# Patient Record
Sex: FEMALE | Race: WHITE | NOT HISPANIC OR LATINO | Employment: FULL TIME | URBAN - METROPOLITAN AREA
[De-identification: names, ages, dates, MRNs, and addresses within clinical notes are randomized per-mention and may not be internally consistent; named-entity substitution may affect disease eponyms.]

---

## 2018-09-06 ENCOUNTER — OFFICE VISIT (OUTPATIENT)
Dept: ALLERGY | Facility: CLINIC | Age: 48
End: 2018-09-06
Payer: COMMERCIAL

## 2018-09-06 ENCOUNTER — LAB VISIT (OUTPATIENT)
Dept: LAB | Facility: HOSPITAL | Age: 48
End: 2018-09-06
Attending: ALLERGY & IMMUNOLOGY
Payer: COMMERCIAL

## 2018-09-06 VITALS
BODY MASS INDEX: 43.34 KG/M2 | DIASTOLIC BLOOD PRESSURE: 92 MMHG | WEIGHT: 285.94 LBS | HEIGHT: 68 IN | SYSTOLIC BLOOD PRESSURE: 140 MMHG

## 2018-09-06 DIAGNOSIS — N20.0 NEPHROLITHIASIS: ICD-10-CM

## 2018-09-06 DIAGNOSIS — L50.1 IDIOPATHIC URTICARIA: ICD-10-CM

## 2018-09-06 DIAGNOSIS — T78.3XXA ANGIOEDEMA, INITIAL ENCOUNTER: ICD-10-CM

## 2018-09-06 DIAGNOSIS — R06.2 WHEEZING: ICD-10-CM

## 2018-09-06 DIAGNOSIS — K21.9 GASTROESOPHAGEAL REFLUX DISEASE WITHOUT ESOPHAGITIS: ICD-10-CM

## 2018-09-06 DIAGNOSIS — R05.9 COUGH: ICD-10-CM

## 2018-09-06 DIAGNOSIS — J31.0 RHINITIS, UNSPECIFIED TYPE: Primary | ICD-10-CM

## 2018-09-06 DIAGNOSIS — G43.909 MIGRAINE WITHOUT STATUS MIGRAINOSUS, NOT INTRACTABLE, UNSPECIFIED MIGRAINE TYPE: ICD-10-CM

## 2018-09-06 DIAGNOSIS — I10 ESSENTIAL HYPERTENSION: ICD-10-CM

## 2018-09-06 DIAGNOSIS — G47.30 SLEEP APNEA, UNSPECIFIED TYPE: ICD-10-CM

## 2018-09-06 DIAGNOSIS — M15.9 PRIMARY OSTEOARTHRITIS INVOLVING MULTIPLE JOINTS: ICD-10-CM

## 2018-09-06 LAB
25(OH)D3+25(OH)D2 SERPL-MCNC: 27 NG/ML
ALBUMIN SERPL BCP-MCNC: 4.1 G/DL
ALP SERPL-CCNC: 65 U/L
ALT SERPL W/O P-5'-P-CCNC: 27 U/L
ANION GAP SERPL CALC-SCNC: 7 MMOL/L
AST SERPL-CCNC: 23 U/L
BASOPHILS # BLD AUTO: 0.05 K/UL
BASOPHILS NFR BLD: 0.7 %
BILIRUB SERPL-MCNC: 0.3 MG/DL
BUN SERPL-MCNC: 14 MG/DL
CALCIUM SERPL-MCNC: 9.8 MG/DL
CHLORIDE SERPL-SCNC: 106 MMOL/L
CO2 SERPL-SCNC: 24 MMOL/L
CREAT SERPL-MCNC: 0.7 MG/DL
DIFFERENTIAL METHOD: NORMAL
EOSINOPHIL # BLD AUTO: 0.3 K/UL
EOSINOPHIL NFR BLD: 3.4 %
ERYTHROCYTE [DISTWIDTH] IN BLOOD BY AUTOMATED COUNT: 14.1 %
EST. GFR  (AFRICAN AMERICAN): >60 ML/MIN/1.73 M^2
EST. GFR  (NON AFRICAN AMERICAN): >60 ML/MIN/1.73 M^2
GLUCOSE SERPL-MCNC: 102 MG/DL
HCT VFR BLD AUTO: 38.5 %
HGB BLD-MCNC: 12.4 G/DL
LYMPHOCYTES # BLD AUTO: 1.9 K/UL
LYMPHOCYTES NFR BLD: 25.7 %
MCH RBC QN AUTO: 27.8 PG
MCHC RBC AUTO-ENTMCNC: 32.2 G/DL
MCV RBC AUTO: 86 FL
MONOCYTES # BLD AUTO: 0.6 K/UL
MONOCYTES NFR BLD: 7.6 %
NEUTROPHILS # BLD AUTO: 4.6 K/UL
NEUTROPHILS NFR BLD: 62.3 %
PLATELET # BLD AUTO: 332 K/UL
PMV BLD AUTO: 9.9 FL
POTASSIUM SERPL-SCNC: 4 MMOL/L
PROT SERPL-MCNC: 6.9 G/DL
RBC # BLD AUTO: 4.46 M/UL
SODIUM SERPL-SCNC: 137 MMOL/L
TSH SERPL DL<=0.005 MIU/L-ACNC: 1.17 UIU/ML
WBC # BLD AUTO: 7.4 K/UL

## 2018-09-06 PROCEDURE — 82306 VITAMIN D 25 HYDROXY: CPT

## 2018-09-06 PROCEDURE — 84165 PROTEIN E-PHORESIS SERUM: CPT

## 2018-09-06 PROCEDURE — 86003 ALLG SPEC IGE CRUDE XTRC EA: CPT

## 2018-09-06 PROCEDURE — 84165 PROTEIN E-PHORESIS SERUM: CPT | Mod: 26,,, | Performed by: PATHOLOGY

## 2018-09-06 PROCEDURE — 83520 IMMUNOASSAY QUANT NOS NONAB: CPT

## 2018-09-06 PROCEDURE — 86003 ALLG SPEC IGE CRUDE XTRC EA: CPT | Mod: 59

## 2018-09-06 PROCEDURE — 84443 ASSAY THYROID STIM HORMONE: CPT

## 2018-09-06 PROCEDURE — 86800 THYROGLOBULIN ANTIBODY: CPT

## 2018-09-06 PROCEDURE — 99245 OFF/OP CONSLTJ NEW/EST HI 55: CPT | Mod: S$GLB,,, | Performed by: ALLERGY & IMMUNOLOGY

## 2018-09-06 PROCEDURE — 82785 ASSAY OF IGE: CPT

## 2018-09-06 PROCEDURE — 36415 COLL VENOUS BLD VENIPUNCTURE: CPT

## 2018-09-06 PROCEDURE — 80053 COMPREHEN METABOLIC PANEL: CPT

## 2018-09-06 PROCEDURE — 99999 PR PBB SHADOW E&M-NEW PATIENT-LVL III: CPT | Mod: PBBFAC,,, | Performed by: ALLERGY & IMMUNOLOGY

## 2018-09-06 PROCEDURE — 85025 COMPLETE CBC W/AUTO DIFF WBC: CPT

## 2018-09-06 PROCEDURE — 86376 MICROSOMAL ANTIBODY EACH: CPT

## 2018-09-06 RX ORDER — OXYBUTYNIN CHLORIDE 15 MG/1
15 TABLET, EXTENDED RELEASE ORAL DAILY
Refills: 3 | COMMUNITY
Start: 2018-08-31

## 2018-09-06 RX ORDER — FUROSEMIDE 20 MG/1
20 TABLET ORAL DAILY PRN
Refills: 2 | COMMUNITY
Start: 2018-08-31 | End: 2019-01-09 | Stop reason: CLARIF

## 2018-09-06 RX ORDER — MELOXICAM 15 MG/1
15 TABLET ORAL DAILY
Refills: 5 | COMMUNITY
Start: 2018-08-31

## 2018-09-06 RX ORDER — EPINEPHRINE 0.3 MG/.3ML
1 INJECTION SUBCUTANEOUS ONCE
Qty: 0.6 ML | Refills: 5 | Status: SHIPPED | OUTPATIENT
Start: 2018-09-06 | End: 2018-12-28

## 2018-09-06 RX ORDER — ONDANSETRON HYDROCHLORIDE 8 MG/1
8 TABLET, FILM COATED ORAL 3 TIMES DAILY
Refills: 5 | COMMUNITY
Start: 2018-08-31 | End: 2018-12-26

## 2018-09-06 NOTE — PROGRESS NOTES
"Gabby Sanchez is referred by Dr. Alexy Isaacs her primary care physician on the [facility] for a consult.   She is here with her .    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  They have lived in [facility], [facility], and [facility].        They have been back in the [place] for the last several months and have been traveling, preaching in most of the [facility].    They will now be stationed in the [facility] including [place], [place], and [facility]. They leave next week and will be back in November.    When they were living in [facility], she developed increased respiratory symptoms.  She was told that she was "allergic to the cow dung that was being burned".  There was a lot of smoke in the area and [place] from [place] because of it and lived in [place].  It was lower in altitude and she was better there.  She did however continue to have recurrent respiratory symptoms that were treated with antibiotics and IM steroids.    This was usually worse in the cooler months.  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    She was given Mucinex which helped a lot.  She also took Flonase and Zyrtec as needed.  She has been taking Zyrtec every day.    About 20 years ago she was traveling in [place].  They passed a rice field and she developed swelling of her face and a rash that was red and pruritic.  She attributed it to the chemicals from a .   She was seen in emergency room and given an unknown medication.    Recently she was in  [place] at almost the same location where the previous reaction occurred.  She again developed redness of her face, itching, a rash, and tongue swelling.  She was seen at doctor's office and given IM cortisone and Benadryl.  She thinks she also had Pepcid.  She did improved.      She has had this sensation of throat closing 3 times after eating " asparagus.  She has diarrhea for several days after eating porter.  She has dizziness around moth balls.  She has diarrhea when under fluorescent lights.    She does have recurrent pruritus of the ears, postnasal drip, frequent throat clearing, a cough that may be productive, and shortness of breath.  She does take albuterol as needed which helps.    For ROS, FH, SH please see Allergy and Asthma Questionnaire dated today.    Some relevant pertinent positives:    Review of Systems:    She has a history of sleep apnea and wears CPAP.  This was diagnosed at [facility].  She is scheduled to get another sleep study to keep her CPAP.  She does find that this is effective.  She has had increased fatigue.   She has reflux and takes Nexium.    She has hyperlipidemia and takes Lipitor.  She has a history of iron deficiency and takes replacement.  She has had swelling of her lower extremities and has been given Lasix.  She thinks that this has helped.  She has a history of nephrolithiasis and passed a stone in June 2018. She has history of migraines with nausea, vomiting, and photophobia.  She takes Excedrin migraine with improvement.  She has pain in multiple joints including her knees and lower back.    Family History:   She had a maternal aunt who needed oxygen.    Home environment:   They are currently living with a friend in the [place].  They will be moving to [place] next week.  They do not have pets.    Social History:   She is a nonsmoker.  She is a missionary.    Physical Examination:  General: Well-developed, well-nourished, no acute distress.    Weight 285 lb.  Head: No sinus tenderness.  Eyes: Conjunctivae:  No bulbar or palpebral conjunctival injection.  Ears: EAC's clear.  TM's clear.  No pre-auricular nodes.  Nose: Nasal Mucosa:  Pink.  Septum: No apparent deviation.  Turbinates:  No significant edema.  Polyps/Mass:  None visible.  Teeth/Gums:  No bleeding noted.  Oropharynx: No exudates.  Neck: Supple without  thyromegaly. No cervical lymphadenopathy.    Respiratory/Chest: Effort: Good.  Auscultation:  Clear bilaterally.  Cardiovascular:  No murmur, rubs, or gallop heard.   GI:  Non-tender.  No masses.  No organomegaly.  Extremities:  No swelling.  No cyanosis, clubbing, or edema.  Skin: Good turgor.  No urticaria or angioedema.  Neuro/Psych: Oriented x 3.    Pictures are reviewed on his cell phone on September 8, 2018 and the lesions are consistent with urticaria.    Assessment:  1.  Chronic rhinitis, consider allergic.  2.  Chronic cough with wheezing and shortness of breath, consider bronchospasm with allergic component.    3.  History of throat closing after asparagus, consider food allergy.    4.  History of urticaria and angioedema of uncertain etiology.    5.  GERD.    6.  Sleep apnea on CPAP.    7.  Hyperlipidemia on Lipitor.    8.  Iron deficiency anemia on replacement.    9.  Hyperlipidemia on Lipitor.  10.  Lower extremity edema on as-needed Lasix.  11.  Nephrolithiasis.  12.  Migraine headaches.    13.  DJD.      Recommendations:  1.  Laboratory as ordered.  2.  Spirometry.  3.  Continue present medications for now.  4.  EpiPen.  This was demonstrated.    5.  Journal all episodes.  6.  Take pictures of any further episodes.  7.  Return to clinic in one week.  8.  Consider skin testing in the future if needed.    EpiPen was demonstrated.

## 2018-09-07 LAB
ALBUMIN SERPL ELPH-MCNC: 3.97 G/DL
ALPHA1 GLOB SERPL ELPH-MCNC: 0.28 G/DL
ALPHA2 GLOB SERPL ELPH-MCNC: 0.76 G/DL
B-GLOBULIN SERPL ELPH-MCNC: 0.76 G/DL
GAMMA GLOB SERPL ELPH-MCNC: 0.92 G/DL
IGE SERPL-ACNC: <35 IU/ML
PATHOLOGIST INTERPRETATION SPE: NORMAL
PROT SERPL-MCNC: 6.7 G/DL
THYROGLOB AB SERPL IA-ACNC: 6.6 IU/ML
THYROPEROXIDASE IGG SERPL-ACNC: <6 IU/ML

## 2018-09-08 LAB — TRYPTASE LEVEL: 3 NG/ML

## 2018-09-10 LAB
A ALTERNATA IGE QN: <0.35 KU/L
A FUMIGATUS IGE QN: <0.35 KU/L
ALLERGEN WHITE PINE TREE IGE: <0.35 KU/L
BERMUDA GRASS IGE QN: <0.35 KU/L
CAT DANDER IGE QN: <0.35 KU/L
CEDAR IGE QN: <0.35 KU/L
D FARINAE IGE QN: <0.35 KU/L
D PTERONYSS IGE QN: <0.35 KU/L
DEPRECATED A ALTERNATA IGE RAST QL: NORMAL
DEPRECATED A FUMIGATUS IGE RAST QL: NORMAL
DEPRECATED BERMUDA GRASS IGE RAST QL: NORMAL
DEPRECATED CAT DANDER IGE RAST QL: NORMAL
DEPRECATED CEDAR IGE RAST QL: NORMAL
DEPRECATED D FARINAE IGE RAST QL: NORMAL
DEPRECATED D PTERONYSS IGE RAST QL: NORMAL
DEPRECATED DOG DANDER IGE RAST QL: NORMAL
DEPRECATED ENGL PLANTAIN IGE RAST QL: NORMAL
DEPRECATED MARSH ELDER IGE RAST QL: NORMAL
DEPRECATED PECAN/HICK TREE IGE RAST QL: NORMAL
DEPRECATED RICE IGE RAST QL: NORMAL
DEPRECATED ROACH IGE RAST QL: NORMAL
DEPRECATED TIMOTHY IGE RAST QL: NORMAL
DEPRECATED WHITE OAK IGE RAST QL: NORMAL
DOG DANDER IGE QN: <0.35 KU/L
ENGL PLANTAIN IGE QN: <0.35 KU/L
MARSH ELDER IGE QN: <0.35 KU/L
PECAN/HICK TREE IGE QN: <0.35 KU/L
RAGWEED, WESTERN IGE: <0.35 KU/L
RAGWEED, WESTERN, CLASS: NORMAL
RICE IGE QN: <0.35 KU/L
ROACH IGE QN: <0.35 KU/L
TIMOTHY IGE QN: <0.35 KU/L
WHITE OAK IGE QN: <0.35 KU/L
WHITE PINE CLASS: NORMAL

## 2018-09-11 ENCOUNTER — HOSPITAL ENCOUNTER (OUTPATIENT)
Dept: PULMONOLOGY | Facility: CLINIC | Age: 48
Discharge: HOME | End: 2018-09-11
Payer: COMMERCIAL

## 2018-09-11 ENCOUNTER — OFFICE VISIT (OUTPATIENT)
Dept: ALLERGY | Facility: CLINIC | Age: 48
End: 2018-09-11
Payer: COMMERCIAL

## 2018-09-11 VITALS
SYSTOLIC BLOOD PRESSURE: 140 MMHG | HEART RATE: 96 BPM | HEIGHT: 67 IN | BODY MASS INDEX: 44.81 KG/M2 | DIASTOLIC BLOOD PRESSURE: 80 MMHG | WEIGHT: 285.5 LBS

## 2018-09-11 DIAGNOSIS — H10.423 SIMPLE CHRONIC CONJUNCTIVITIS OF BOTH EYES: ICD-10-CM

## 2018-09-11 DIAGNOSIS — G43.909 MIGRAINE WITHOUT STATUS MIGRAINOSUS, NOT INTRACTABLE, UNSPECIFIED MIGRAINE TYPE: ICD-10-CM

## 2018-09-11 DIAGNOSIS — T78.3XXD ANGIOEDEMA, SUBSEQUENT ENCOUNTER: Primary | ICD-10-CM

## 2018-09-11 DIAGNOSIS — R05.9 COUGH: ICD-10-CM

## 2018-09-11 DIAGNOSIS — L50.1 IDIOPATHIC URTICARIA: ICD-10-CM

## 2018-09-11 DIAGNOSIS — T78.3XXA ANGIOEDEMA, INITIAL ENCOUNTER: ICD-10-CM

## 2018-09-11 DIAGNOSIS — J31.0 RHINITIS, UNSPECIFIED TYPE: ICD-10-CM

## 2018-09-11 LAB
POST FEV1 FVC: 0.8
POST FEV1: 2.24
POST FVC: 2.79
PRE FEV1 FVC: 79
PRE FEV1: 2.18
PRE FVC: 2.75
PREDICTED FEV1 FVC: 81
PREDICTED FEV1: 2.78
PREDICTED FVC: 3.41

## 2018-09-11 PROCEDURE — 99214 OFFICE O/P EST MOD 30 MIN: CPT | Mod: S$GLB,,, | Performed by: ALLERGY & IMMUNOLOGY

## 2018-09-11 PROCEDURE — 94060 EVALUATION OF WHEEZING: CPT | Mod: S$GLB,,, | Performed by: INTERNAL MEDICINE

## 2018-09-11 PROCEDURE — 99999 PR PBB SHADOW E&M-EST. PATIENT-LVL III: CPT | Mod: PBBFAC,,, | Performed by: ALLERGY & IMMUNOLOGY

## 2018-09-11 RX ORDER — ALBUTEROL SULFATE 90 UG/1
2 AEROSOL, METERED RESPIRATORY (INHALATION) EVERY 4 HOURS PRN
Qty: 18 G | Refills: 3 | Status: SHIPPED | OUTPATIENT
Start: 2018-09-11

## 2018-09-11 RX ORDER — OMEPRAZOLE 40 MG/1
40 CAPSULE, DELAYED RELEASE ORAL EVERY MORNING
Qty: 30 CAPSULE | Refills: 3 | Status: SHIPPED | OUTPATIENT
Start: 2018-09-11 | End: 2018-12-26

## 2018-09-11 RX ORDER — ERGOCALCIFEROL 1.25 MG/1
50000 CAPSULE ORAL
Qty: 12 CAPSULE | Refills: 1 | Status: SHIPPED | OUTPATIENT
Start: 2018-09-11 | End: 2018-12-26

## 2018-09-11 NOTE — PROGRESS NOTES
Gabby Sanchez returns to clinic today for continued evaluation of chronic rhinitis, cough, shortness of breath and a rash.  She is here with her .  She was last seen September 6, 2018.    Since her last visit, she has continued to have some mild symptoms but not as severe as she has in the past.    She continues to have some swelling of both feet and both hands.  She does not think that the fluid pill that her primary care physician gave her is working as well as it has in the past.    She does continue to have pruritus of the ears, postnasal drip, frequent throat clearing, a nonproductive cough, and some mild shortness of breath.    She has not had any rash.  She has had some itching of both hands.    She does have gastroesophageal reflux disease that is controlled on omeprazole 20 mg a day.  If she does not take this she does have significant symptoms.    She did get her EpiPen.    They are leaving tomorrow for the [facility].  They will be back in November or December.    [facility] PEQ ALLERGY QUESTIONNAIRE SHORT 9/11/2018   Are you taking any new medications since your last visit? No   Constitution: No changes since my last visit with this provider   Head or facial pain: Headaches   Eyes: Itching, Tearing   Ears: Itching   Nose: Post nasal drip   Throat: No symptoms   Sinuses: No symptoms   Lungs: No symptoms   Skin: Itching   Cardiovascular: Leg swelling   Gastrointestinal: Nausea, vomiting   Genital/ urinary No symptoms   Musculoskeletal: Back pain   Neurologic: Headaches   Endocrine: No symptoms   Hematologic: No symptoms     Physical Examination:  General: Well-developed, well-nourished, no acute distress.     Head: No sinus tenderness.  Eyes: Conjunctivae:  No bulbar or palpebral conjunctival injection.  Ears: EAC's clear.  TM's clear.  No pre-auricular nodes.  Nose: Nasal Mucosa:  Pink.  Septum: No apparent deviation.  Turbinates:  No significant edema.  Polyps/Mass:  None visible.  Teeth/Gums:  No  bleeding noted.  Oropharynx: No exudates.  Neck: Supple without thyromegaly. No cervical lymphadenopathy.    Respiratory/Chest: Effort: Good.  Auscultation:  Clear bilaterally.  Skin: Good turgor.  No urticaria or angioedema.  Neuro/Psych: Oriented x 3.    Pictures are reviewed on his cell phone on September 8, 2018 and the lesions are consistent with urticaria.    Laboratory 09/06/2018:  IgE level:  Less than 35.  ImmunoCAP:  Negative.  CBC:  Normal.  CMP:  Normal.  SPEP:  Normal.  TSH:  1.171.  Thyroid peroxidase antibody level:  Less than 6.0.  Thyroglobulin antibody level:  6.6.  Vitamin-D Level:  27.  Serum tryptase:  3.0.    Spirometry 09/11/2018:  Pending.    Assessment:  1.  Chronic rhinitis, consider allergic.  2.  Chronic cough with wheezing and shortness of breath, consider bronchospasm with allergic component.    3.  History of throat closing after asparagus, consider food allergy.    4.  History of urticaria and angioedema of uncertain etiology.    5.  GERD.    6.  Sleep apnea on CPAP.    7.  Hyperlipidemia on Lipitor.    8.  Iron deficiency anemia on replacement.    9.  Hyperlipidemia on Lipitor.  10.  Lower extremity edema on as-needed Lasix.  11.  Nephrolithiasis.  12.  Migraine headaches.    13.  DJD.    14.  Vitamin-D insufficiency.    Recommendations:  1.  Continue present medications for now.  2.  Increase omeprazole to 40 mg a day and follow symptoms.  Discussed increasing this to 80 mg a day if symptoms are not controlled.  3.  Discussed ENT evaluation in the future.  4.  She has an EpiPen and knows how to use it.  5.  Take pictures of any future skin lesions.  6.  Return to clinic when back in [place].

## 2018-09-13 LAB
ALLERGEN NAME: NORMAL
ALLERGEN RESULT: NORMAL

## 2018-09-24 LAB
ALLERGEN NAME: NORMAL
ALLERGEN RESULT: NORMAL

## 2018-12-26 ENCOUNTER — OFFICE VISIT (OUTPATIENT)
Dept: SURGERY | Facility: CLINIC | Age: 48
End: 2018-12-26
Payer: COMMERCIAL

## 2018-12-26 VITALS
HEART RATE: 76 BPM | SYSTOLIC BLOOD PRESSURE: 149 MMHG | HEIGHT: 68 IN | BODY MASS INDEX: 40.62 KG/M2 | WEIGHT: 268 LBS | DIASTOLIC BLOOD PRESSURE: 88 MMHG

## 2018-12-26 DIAGNOSIS — K80.10 CALCULUS OF GALLBLADDER WITH CHRONIC CHOLECYSTITIS WITHOUT OBSTRUCTION: Primary | ICD-10-CM

## 2018-12-26 PROCEDURE — 99203 OFFICE O/P NEW LOW 30 MIN: CPT | Mod: S$GLB,,, | Performed by: SURGERY

## 2018-12-26 RX ORDER — SODIUM CHLORIDE 9 MG/ML
INJECTION, SOLUTION INTRAVENOUS CONTINUOUS
Status: CANCELLED | OUTPATIENT
Start: 2018-12-26

## 2018-12-26 RX ORDER — HYDROCHLOROTHIAZIDE 25 MG/1
25 TABLET ORAL DAILY
Refills: 3 | COMMUNITY
Start: 2018-12-24

## 2018-12-26 RX ORDER — LIDOCAINE HYDROCHLORIDE 10 MG/ML
1 INJECTION, SOLUTION EPIDURAL; INFILTRATION; INTRACAUDAL; PERINEURAL ONCE
Status: DISCONTINUED | OUTPATIENT
Start: 2018-12-26 | End: 2018-12-31 | Stop reason: HOSPADM

## 2018-12-26 RX ORDER — BENZONATATE 200 MG/1
CAPSULE ORAL
Refills: 0 | COMMUNITY
Start: 2018-12-23

## 2018-12-26 RX ORDER — ONDANSETRON 8 MG/1
8 TABLET, ORALLY DISINTEGRATING ORAL EVERY 8 HOURS PRN
Refills: 0 | COMMUNITY
Start: 2018-12-23

## 2018-12-26 NOTE — PROGRESS NOTES
Subjective:       Patient ID: Gabby Sanchez is a 48 y.o. female.    Chief Complaint: Cholelithiasis    HPI 49 yo female with gallstones and abdominal pain for at least 6 weeks   Review of Systems   Constitutional: Negative.    HENT: Negative.    Eyes: Negative.    Respiratory: Negative.    Cardiovascular: Negative.    Gastrointestinal: Negative.    Endocrine: Negative.    Musculoskeletal: Negative.    Skin: Negative.    Allergic/Immunologic: Negative.    Neurological: Negative.    Hematological: Negative.    Psychiatric/Behavioral: Negative.    All other systems reviewed and are negative.      Objective:      Physical Exam   Constitutional: She is oriented to person, place, and time. She appears well-developed and well-nourished.   HENT:   Head: Normocephalic and atraumatic.   Right Ear: External ear normal.   Left Ear: External ear normal.   Nose: Nose normal.   Mouth/Throat: Oropharynx is clear and moist.   Eyes: Conjunctivae and EOM are normal. Pupils are equal, round, and reactive to light.   Neck: Normal range of motion. Neck supple.   Cardiovascular: Normal rate, regular rhythm, normal heart sounds and intact distal pulses.   Pulmonary/Chest: Effort normal and breath sounds normal.   Abdominal: Soft. Bowel sounds are normal.   Musculoskeletal: Normal range of motion.   Neurological: She is alert and oriented to person, place, and time. She has normal reflexes.   Skin: Skin is warm and dry.   Psychiatric: She has a normal mood and affect. Her behavior is normal. Thought content normal.   Vitals reviewed.      Assessment:       1. Calculus of gallbladder with chronic cholecystitis without obstruction        Plan:       To the OR for lap patty with the risks and possible complications and risks explained and she agrees to proceed

## 2018-12-26 NOTE — LETTER
December 26, 2018      Thompson Myrick MD  [address] [place] [address]  [address]  [facility], [facility]  [place] [address]           [facility]  [address] [facility] [address], [address]  [place] [address]  Phone: [phone]  Fax: [phone]          Patient: Gabby Sanchez   MR Number: [id number]   YOB: 1970   Date of Visit: 12/26/2018       Dear Dr. Thompson Myrick:    Thank you for referring Gabby Sanchez to me for evaluation. Attached you will find relevant portions of my assessment and plan of care.    If you have questions, please do not hesitate to call me. I look forward to following Gabby Sanchez along with you.    Sincerely,    Cole Sam MD    Enclosure  CC:  No Recipients    If you would like to receive this communication electronically, please contact [email or web address] or [phone] to request more information on [facility] access.    For providers and/or their staff who would like to refer a patient to [facility], please contact us through our one-stop-shop provider referral line, [facility], at [phone].    If you feel you have received this communication in error or would no longer like to receive these types of communications, please e-mail [email or web address]

## 2018-12-28 ENCOUNTER — HOSPITAL ENCOUNTER (OUTPATIENT)
Dept: PREADMISSION TESTING | Facility: HOSPITAL | Age: 48
Discharge: HOME | End: 2018-12-28
Attending: SURGERY
Payer: COMMERCIAL

## 2018-12-28 VITALS
HEART RATE: 74 BPM | BODY MASS INDEX: 42.21 KG/M2 | TEMPERATURE: 97 F | WEIGHT: 268.94 LBS | OXYGEN SATURATION: 95 % | HEIGHT: 67 IN | RESPIRATION RATE: 17 BRPM | DIASTOLIC BLOOD PRESSURE: 91 MMHG | SYSTOLIC BLOOD PRESSURE: 146 MMHG

## 2018-12-28 DIAGNOSIS — Z01.818 PRE-OP TESTING: Primary | ICD-10-CM

## 2018-12-28 DIAGNOSIS — K80.10 CALCULUS OF GALLBLADDER WITH CHRONIC CHOLECYSTITIS WITHOUT OBSTRUCTION: ICD-10-CM

## 2018-12-28 LAB
ALBUMIN SERPL BCP-MCNC: 4.1 G/DL
ALP SERPL-CCNC: 68 U/L
ALT SERPL W/O P-5'-P-CCNC: 27 U/L
ANION GAP SERPL CALC-SCNC: 10 MMOL/L
AST SERPL-CCNC: 23 U/L
BASOPHILS # BLD AUTO: 0.04 K/UL
BASOPHILS NFR BLD: 0.5 %
BILIRUB SERPL-MCNC: 0.6 MG/DL
BUN SERPL-MCNC: 10 MG/DL
CALCIUM SERPL-MCNC: 9.7 MG/DL
CHLORIDE SERPL-SCNC: 103 MMOL/L
CO2 SERPL-SCNC: 23 MMOL/L
CREAT SERPL-MCNC: 0.6 MG/DL
DIFFERENTIAL METHOD: ABNORMAL
EOSINOPHIL # BLD AUTO: 0.2 K/UL
EOSINOPHIL NFR BLD: 2.1 %
ERYTHROCYTE [DISTWIDTH] IN BLOOD BY AUTOMATED COUNT: 14.7 %
EST. GFR  (AFRICAN AMERICAN): >60 ML/MIN/1.73 M^2
EST. GFR  (NON AFRICAN AMERICAN): >60 ML/MIN/1.73 M^2
GLUCOSE SERPL-MCNC: 95 MG/DL
HCT VFR BLD AUTO: 39.8 %
HGB BLD-MCNC: 12.8 G/DL
LYMPHOCYTES # BLD AUTO: 2.1 K/UL
LYMPHOCYTES NFR BLD: 25.8 %
MCH RBC QN AUTO: 26.8 PG
MCHC RBC AUTO-ENTMCNC: 32.2 G/DL
MCV RBC AUTO: 83 FL
MONOCYTES # BLD AUTO: 0.6 K/UL
MONOCYTES NFR BLD: 7.7 %
NEUTROPHILS # BLD AUTO: 5.1 K/UL
NEUTROPHILS NFR BLD: 63.9 %
PLATELET # BLD AUTO: 327 K/UL
PMV BLD AUTO: 9.5 FL
POTASSIUM SERPL-SCNC: 3.8 MMOL/L
PROT SERPL-MCNC: 7.2 G/DL
RBC # BLD AUTO: 4.77 M/UL
SODIUM SERPL-SCNC: 136 MMOL/L
WBC # BLD AUTO: 7.95 K/UL

## 2018-12-28 PROCEDURE — 36415 COLL VENOUS BLD VENIPUNCTURE: CPT

## 2018-12-28 PROCEDURE — 93005 ELECTROCARDIOGRAM TRACING: CPT

## 2018-12-28 PROCEDURE — 93010 ELECTROCARDIOGRAM REPORT: CPT | Mod: ,,, | Performed by: INTERNAL MEDICINE

## 2018-12-28 PROCEDURE — 80053 COMPREHEN METABOLIC PANEL: CPT

## 2018-12-28 PROCEDURE — 85025 COMPLETE CBC W/AUTO DIFF WBC: CPT

## 2018-12-28 RX ORDER — NITROFURANTOIN (MACROCRYSTALS) 100 MG/1
100 CAPSULE ORAL DAILY PRN
COMMUNITY

## 2018-12-28 RX ORDER — OMEPRAZOLE 20 MG/1
40 CAPSULE, DELAYED RELEASE ORAL NIGHTLY
COMMUNITY

## 2018-12-28 NOTE — DISCHARGE INSTRUCTIONS
Your surgery is scheduled for _Monday Dec. 31, 2018__.    Call [phone] between 2 p.m. and 5 p.m. on   _Today___ to find out your arrival time for the day of your surgery.      Please report to SAME DAY SURGERY UNIT on the 2nd FLOOR at _______ a.m.  Use front door entrance. The doors open at 0530 am.      INSTRUCTIONS IMPORTANT!!!  ¨ Do not eat or drink after 12 midnight-including water. OK to brush teeth, no   gum, candy or mints!    STOP MOBIC (MELOXICAM) TODAY, UNTIL AFTER SURGERY.      _X___  Prep instructions:  SHOWER     _X___  Please shower using Hibiclens soap the night before AND  the morning of your surgery/procedure. Do not use Hibiclens on your face or genitals       X        If your surgery is around your belly button (Navel) be sure to wash inside your belly button also. Rinse hibiclens off completely.  _X___  No shaving of procedural area at least 4-5 days before surgery due to increased risk of skin irritation and/or possible infection.  _X___  Do not wear makeup, including mascara. WEARING EYE MAKEUP MAY LEAD TO SERIOUS EYE INJURY during surgery.  _X___  No powder, lotions or creams to your body.  _X___  You may wear only deodorant on the day of surgery.  X____  Please remove all jewelry, including piercings and leave at home.  _X___  No money or valuables needed. Please leave at home.  You may bring your  cell phone.  ____  Please bring any documents given by your doctor.  _X___  If going home the same day, arrange for a ride home. You will not be able to   drive if Anesthesia was used.  ____  Children under 18 years require a parent / guardian present the entire time   they are in surgery / recovery.  _X___  Wear loose fitting clothing. Allow for dressings, bandages.  _X___  Stop Aspirin, Ibuprofen, Motrin and Aleve at least 3-5 days before  surgery, unless otherwise instructed by your doctor, or the nurse.              You MAY use Tylenol/acetaminophen until day of surgery.  ____  If you take  diabetic medication, do not take am of surgery unless instructed by   Doctor.  __X__  Call MD for temperature above 101 degrees.        __X__ Stop taking any Fish Oil supplement or any Vitamins that contain Vitamin E at least 5 days prior to surgery.          I have read or had read and explained to me, and understand the above information.  Additional comments or instructions:Please call   [phone] if you have any questions regarding the instructions above.

## 2018-12-31 ENCOUNTER — ANESTHESIA (OUTPATIENT)
Dept: SURGERY | Facility: HOSPITAL | Age: 48
End: 2018-12-31
Payer: COMMERCIAL

## 2018-12-31 ENCOUNTER — HOSPITAL ENCOUNTER (OUTPATIENT)
Facility: HOSPITAL | Age: 48
Discharge: HOME | End: 2018-12-31
Attending: SURGERY | Admitting: SURGERY
Payer: COMMERCIAL

## 2018-12-31 ENCOUNTER — ANESTHESIA EVENT (OUTPATIENT)
Dept: SURGERY | Facility: HOSPITAL | Age: 48
End: 2018-12-31
Payer: COMMERCIAL

## 2018-12-31 VITALS
DIASTOLIC BLOOD PRESSURE: 74 MMHG | SYSTOLIC BLOOD PRESSURE: 153 MMHG | BODY MASS INDEX: 42.06 KG/M2 | TEMPERATURE: 99 F | HEIGHT: 67 IN | HEART RATE: 89 BPM | RESPIRATION RATE: 16 BRPM | WEIGHT: 268 LBS | OXYGEN SATURATION: 97 %

## 2018-12-31 DIAGNOSIS — K80.10 CALCULUS OF GALLBLADDER WITH CHRONIC CHOLECYSTITIS WITHOUT OBSTRUCTION: Primary | ICD-10-CM

## 2018-12-31 LAB — POCT GLUCOSE: 101 MG/DL (ref 70–110)

## 2018-12-31 PROCEDURE — 88304 TISSUE EXAM BY PATHOLOGIST: CPT | Performed by: PATHOLOGY

## 2018-12-31 PROCEDURE — 25000242 PHARM REV CODE 250 ALT 637 W/ HCPCS: Performed by: ANESTHESIOLOGY

## 2018-12-31 PROCEDURE — 71000016 HC POSTOP RECOV ADDL HR: Performed by: SURGERY

## 2018-12-31 PROCEDURE — 47562 LAPAROSCOPIC CHOLECYSTECTOMY: CPT | Mod: ,,, | Performed by: SURGERY

## 2018-12-31 PROCEDURE — 27201423 OPTIME MED/SURG SUP & DEVICES STERILE SUPPLY: Performed by: SURGERY

## 2018-12-31 PROCEDURE — 36000709 HC OR TIME LEV III EA ADD 15 MIN: Performed by: SURGERY

## 2018-12-31 PROCEDURE — 82962 GLUCOSE BLOOD TEST: CPT | Performed by: SURGERY

## 2018-12-31 PROCEDURE — 37000009 HC ANESTHESIA EA ADD 15 MINS: Performed by: SURGERY

## 2018-12-31 PROCEDURE — 71000015 HC POSTOP RECOV 1ST HR: Performed by: SURGERY

## 2018-12-31 PROCEDURE — 36000708 HC OR TIME LEV III 1ST 15 MIN: Performed by: SURGERY

## 2018-12-31 PROCEDURE — 47562 PR LAP,CHOLECYSTECTOMY: ICD-10-PCS | Mod: ,,, | Performed by: SURGERY

## 2018-12-31 PROCEDURE — D9220A PRA ANESTHESIA: ICD-10-PCS | Mod: CRNA,,, | Performed by: NURSE ANESTHETIST, CERTIFIED REGISTERED

## 2018-12-31 PROCEDURE — 25000242 PHARM REV CODE 250 ALT 637 W/ HCPCS: Performed by: NURSE ANESTHETIST, CERTIFIED REGISTERED

## 2018-12-31 PROCEDURE — D9220A PRA ANESTHESIA: Mod: ANES,,, | Performed by: ANESTHESIOLOGY

## 2018-12-31 PROCEDURE — S0020 INJECTION, BUPIVICAINE HYDRO: HCPCS | Performed by: SURGERY

## 2018-12-31 PROCEDURE — 63600175 PHARM REV CODE 636 W HCPCS: Performed by: SURGERY

## 2018-12-31 PROCEDURE — 88304 TISSUE SPECIMEN TO PATHOLOGY - SURGERY: ICD-10-PCS | Mod: 26,,, | Performed by: PATHOLOGY

## 2018-12-31 PROCEDURE — D9220A PRA ANESTHESIA: ICD-10-PCS | Mod: ANES,,, | Performed by: ANESTHESIOLOGY

## 2018-12-31 PROCEDURE — 37000008 HC ANESTHESIA 1ST 15 MINUTES: Performed by: SURGERY

## 2018-12-31 PROCEDURE — 25000003 PHARM REV CODE 250: Performed by: SURGERY

## 2018-12-31 PROCEDURE — 25000003 PHARM REV CODE 250: Performed by: ANESTHESIOLOGY

## 2018-12-31 PROCEDURE — C9290 INJ, BUPIVACAINE LIPOSOME: HCPCS | Performed by: SURGERY

## 2018-12-31 PROCEDURE — 71000033 HC RECOVERY, INTIAL HOUR: Performed by: SURGERY

## 2018-12-31 PROCEDURE — 25000003 PHARM REV CODE 250: Performed by: NURSE ANESTHETIST, CERTIFIED REGISTERED

## 2018-12-31 PROCEDURE — 63600175 PHARM REV CODE 636 W HCPCS: Performed by: NURSE ANESTHETIST, CERTIFIED REGISTERED

## 2018-12-31 PROCEDURE — 88304 TISSUE EXAM BY PATHOLOGIST: CPT | Mod: 26,,, | Performed by: PATHOLOGY

## 2018-12-31 PROCEDURE — D9220A PRA ANESTHESIA: Mod: CRNA,,, | Performed by: NURSE ANESTHETIST, CERTIFIED REGISTERED

## 2018-12-31 RX ORDER — ALBUTEROL SULFATE 90 UG/1
AEROSOL, METERED RESPIRATORY (INHALATION)
Status: DISCONTINUED | OUTPATIENT
Start: 2018-12-31 | End: 2018-12-31

## 2018-12-31 RX ORDER — NEOSTIGMINE METHYLSULFATE 1 MG/ML
INJECTION, SOLUTION INTRAVENOUS
Status: DISCONTINUED | OUTPATIENT
Start: 2018-12-31 | End: 2018-12-31

## 2018-12-31 RX ORDER — IPRATROPIUM BROMIDE AND ALBUTEROL SULFATE 2.5; .5 MG/3ML; MG/3ML
3 SOLUTION RESPIRATORY (INHALATION)
Status: COMPLETED | OUTPATIENT
Start: 2018-12-31 | End: 2018-12-31

## 2018-12-31 RX ORDER — METOCLOPRAMIDE HYDROCHLORIDE 5 MG/ML
INJECTION INTRAMUSCULAR; INTRAVENOUS
Status: DISCONTINUED | OUTPATIENT
Start: 2018-12-31 | End: 2018-12-31

## 2018-12-31 RX ORDER — ROCURONIUM BROMIDE 10 MG/ML
INJECTION, SOLUTION INTRAVENOUS
Status: DISCONTINUED | OUTPATIENT
Start: 2018-12-31 | End: 2018-12-31

## 2018-12-31 RX ORDER — GLYCOPYRROLATE 0.2 MG/ML
INJECTION INTRAMUSCULAR; INTRAVENOUS
Status: DISCONTINUED | OUTPATIENT
Start: 2018-12-31 | End: 2018-12-31

## 2018-12-31 RX ORDER — ONDANSETRON 2 MG/ML
4 INJECTION INTRAMUSCULAR; INTRAVENOUS EVERY 6 HOURS PRN
Status: DISCONTINUED | OUTPATIENT
Start: 2018-12-31 | End: 2018-12-31 | Stop reason: HOSPADM

## 2018-12-31 RX ORDER — SODIUM CHLORIDE, SODIUM LACTATE, POTASSIUM CHLORIDE, CALCIUM CHLORIDE 600; 310; 30; 20 MG/100ML; MG/100ML; MG/100ML; MG/100ML
INJECTION, SOLUTION INTRAVENOUS CONTINUOUS PRN
Status: DISCONTINUED | OUTPATIENT
Start: 2018-12-31 | End: 2018-12-31

## 2018-12-31 RX ORDER — SCOLOPAMINE TRANSDERMAL SYSTEM 1 MG/1
1 PATCH, EXTENDED RELEASE TRANSDERMAL
Status: DISCONTINUED | OUTPATIENT
Start: 2018-12-31 | End: 2018-12-31 | Stop reason: HOSPADM

## 2018-12-31 RX ORDER — CEFAZOLIN SODIUM 2 G/50ML
2 SOLUTION INTRAVENOUS
Status: COMPLETED | OUTPATIENT
Start: 2018-12-31 | End: 2018-12-31

## 2018-12-31 RX ORDER — SODIUM CHLORIDE 0.9 % (FLUSH) 0.9 %
3 SYRINGE (ML) INJECTION
Status: DISCONTINUED | OUTPATIENT
Start: 2018-12-31 | End: 2018-12-31 | Stop reason: HOSPADM

## 2018-12-31 RX ORDER — DEXAMETHASONE SODIUM PHOSPHATE 4 MG/ML
INJECTION, SOLUTION INTRA-ARTICULAR; INTRALESIONAL; INTRAMUSCULAR; INTRAVENOUS; SOFT TISSUE
Status: DISCONTINUED | OUTPATIENT
Start: 2018-12-31 | End: 2018-12-31

## 2018-12-31 RX ORDER — BUPIVACAINE HYDROCHLORIDE 2.5 MG/ML
INJECTION, SOLUTION INFILTRATION; PERINEURAL
Status: DISCONTINUED | OUTPATIENT
Start: 2018-12-31 | End: 2018-12-31 | Stop reason: HOSPADM

## 2018-12-31 RX ORDER — SODIUM CHLORIDE 9 MG/ML
INJECTION, SOLUTION INTRAVENOUS CONTINUOUS
Status: DISCONTINUED | OUTPATIENT
Start: 2018-12-31 | End: 2018-12-31 | Stop reason: HOSPADM

## 2018-12-31 RX ORDER — ONDANSETRON 2 MG/ML
INJECTION INTRAMUSCULAR; INTRAVENOUS
Status: DISCONTINUED | OUTPATIENT
Start: 2018-12-31 | End: 2018-12-31

## 2018-12-31 RX ORDER — LIDOCAINE HCL/PF 100 MG/5ML
SYRINGE (ML) INTRAVENOUS
Status: DISCONTINUED | OUTPATIENT
Start: 2018-12-31 | End: 2018-12-31

## 2018-12-31 RX ORDER — ACETAMINOPHEN 10 MG/ML
1000 INJECTION, SOLUTION INTRAVENOUS ONCE
Status: COMPLETED | OUTPATIENT
Start: 2018-12-31 | End: 2018-12-31

## 2018-12-31 RX ORDER — MORPHINE SULFATE 10 MG/ML
3 INJECTION INTRAMUSCULAR; INTRAVENOUS; SUBCUTANEOUS
Status: DISCONTINUED | OUTPATIENT
Start: 2018-12-31 | End: 2018-12-31 | Stop reason: HOSPADM

## 2018-12-31 RX ORDER — PHENYLEPHRINE HYDROCHLORIDE 10 MG/ML
INJECTION INTRAVENOUS
Status: DISCONTINUED | OUTPATIENT
Start: 2018-12-31 | End: 2018-12-31

## 2018-12-31 RX ORDER — PROPOFOL 10 MG/ML
VIAL (ML) INTRAVENOUS
Status: DISCONTINUED | OUTPATIENT
Start: 2018-12-31 | End: 2018-12-31

## 2018-12-31 RX ORDER — FENTANYL CITRATE 50 UG/ML
INJECTION, SOLUTION INTRAMUSCULAR; INTRAVENOUS
Status: DISCONTINUED | OUTPATIENT
Start: 2018-12-31 | End: 2018-12-31

## 2018-12-31 RX ORDER — FENTANYL CITRATE 50 UG/ML
25 INJECTION, SOLUTION INTRAMUSCULAR; INTRAVENOUS EVERY 5 MIN PRN
Status: DISCONTINUED | OUTPATIENT
Start: 2018-12-31 | End: 2018-12-31 | Stop reason: HOSPADM

## 2018-12-31 RX ORDER — MIDAZOLAM HYDROCHLORIDE 1 MG/ML
INJECTION, SOLUTION INTRAMUSCULAR; INTRAVENOUS
Status: DISCONTINUED | OUTPATIENT
Start: 2018-12-31 | End: 2018-12-31

## 2018-12-31 RX ORDER — ACETAMINOPHEN 500 MG
1000 TABLET ORAL EVERY 6 HOURS PRN
COMMUNITY

## 2018-12-31 RX ORDER — HYDROCODONE BITARTRATE AND ACETAMINOPHEN 5; 325 MG/1; MG/1
1 TABLET ORAL EVERY 6 HOURS PRN
Qty: 30 TABLET | Refills: 0 | Status: SHIPPED | OUTPATIENT
Start: 2018-12-31 | End: 2019-01-09 | Stop reason: SDUPTHER

## 2018-12-31 RX ORDER — HYDROMORPHONE HYDROCHLORIDE 2 MG/ML
0.2 INJECTION, SOLUTION INTRAMUSCULAR; INTRAVENOUS; SUBCUTANEOUS EVERY 5 MIN PRN
Status: DISCONTINUED | OUTPATIENT
Start: 2018-12-31 | End: 2018-12-31 | Stop reason: HOSPADM

## 2018-12-31 RX ADMIN — SODIUM CHLORIDE, SODIUM LACTATE, POTASSIUM CHLORIDE, AND CALCIUM CHLORIDE: .6; .31; .03; .02 INJECTION, SOLUTION INTRAVENOUS at 07:12

## 2018-12-31 RX ADMIN — GLYCOPYRROLATE 0.8 MG: 0.2 INJECTION, SOLUTION INTRAMUSCULAR; INTRAVENOUS at 08:12

## 2018-12-31 RX ADMIN — FENTANYL CITRATE 100 MCG: 50 INJECTION INTRAMUSCULAR; INTRAVENOUS at 07:12

## 2018-12-31 RX ADMIN — METOCLOPRAMIDE 10 MG: 5 INJECTION, SOLUTION INTRAMUSCULAR; INTRAVENOUS at 08:12

## 2018-12-31 RX ADMIN — PROPOFOL 100 MG: 10 INJECTION, EMULSION INTRAVENOUS at 07:12

## 2018-12-31 RX ADMIN — CEFAZOLIN SODIUM 2 G: 2 SOLUTION INTRAVENOUS at 07:12

## 2018-12-31 RX ADMIN — ACETAMINOPHEN 1000 MG: 10 INJECTION, SOLUTION INTRAVENOUS at 09:12

## 2018-12-31 RX ADMIN — NEOSTIGMINE METHYLSULFATE 5 MG: 1 INJECTION INTRAVENOUS at 08:12

## 2018-12-31 RX ADMIN — PHENYLEPHRINE HYDROCHLORIDE 100 MCG: 10 INJECTION INTRAVENOUS at 08:12

## 2018-12-31 RX ADMIN — PROPOFOL 40 MG: 10 INJECTION, EMULSION INTRAVENOUS at 07:12

## 2018-12-31 RX ADMIN — ROCURONIUM BROMIDE 20 MG: 10 INJECTION, SOLUTION INTRAVENOUS at 07:12

## 2018-12-31 RX ADMIN — PHENYLEPHRINE HYDROCHLORIDE 200 MCG: 10 INJECTION INTRAVENOUS at 08:12

## 2018-12-31 RX ADMIN — ROCURONIUM BROMIDE 10 MG: 10 INJECTION, SOLUTION INTRAVENOUS at 07:12

## 2018-12-31 RX ADMIN — IPRATROPIUM BROMIDE AND ALBUTEROL SULFATE 3 ML: .5; 3 SOLUTION RESPIRATORY (INHALATION) at 07:12

## 2018-12-31 RX ADMIN — ALBUTEROL SULFATE 4 PUFF: 90 AEROSOL, METERED RESPIRATORY (INHALATION) at 08:12

## 2018-12-31 RX ADMIN — PROPOFOL 160 MG: 10 INJECTION, EMULSION INTRAVENOUS at 07:12

## 2018-12-31 RX ADMIN — ONDANSETRON 4 MG: 2 INJECTION, SOLUTION INTRAMUSCULAR; INTRAVENOUS at 08:12

## 2018-12-31 RX ADMIN — MIDAZOLAM HYDROCHLORIDE 2 MG: 1 INJECTION, SOLUTION INTRAMUSCULAR; INTRAVENOUS at 07:12

## 2018-12-31 RX ADMIN — LIDOCAINE HYDROCHLORIDE 100 MG: 20 INJECTION, SOLUTION INTRAVENOUS at 07:12

## 2018-12-31 RX ADMIN — SCOPALAMINE 1 PATCH: 1 PATCH, EXTENDED RELEASE TRANSDERMAL at 07:12

## 2018-12-31 RX ADMIN — DEXAMETHASONE SODIUM PHOSPHATE 6 MG: 4 INJECTION, SOLUTION INTRAMUSCULAR; INTRAVENOUS at 08:12

## 2018-12-31 RX ADMIN — GLYCOPYRROLATE 0.1 MG: 0.2 INJECTION, SOLUTION INTRAMUSCULAR; INTRAVENOUS at 07:12

## 2018-12-31 NOTE — DISCHARGE SUMMARY
[facility]  General Surgery  Discharge Summary      Patient Name: Gabby Sanchez  MRN: [id number]  Admission Date: 12/31/2018  Hospital Length of Stay: 0 days  Discharge Date and Time:  12/31/2018 8:49 AM  Attending Physician: Cole Sam MD   Discharging Provider: Vinita Angel MD  Primary Care Provider: Alexy Isaacs MD     HPI: 48 year old female with symptomatic cholelithiasis.    Procedure(s) (LRB):  CHOLECYSTECTOMY, LAPAROSCOPIC (N/A)     Hospital Course: 48 year old female with symptomatic cholelithiasis status post laparoscopic cholecystectomy on 12/31/18 without complication. Once more fully awake she will be discharged from [facility] [place]. She should follow-up in 1-2 weeks in Surgery clinic with Dr. Sam as scheduled.    Consults: none    Significant Diagnostic Studies: none    Pending Diagnostic Studies:     None        Final Active Diagnoses:    Diagnosis Date Noted POA    PRINCIPAL PROBLEM:  Calculus of gallbladder with chronic cholecystitis without obstruction [K80.10] 12/26/2018 Yes      Problems Resolved During this Admission:      Discharged Condition: good    Disposition: home or selfcare    Follow Up:  Follow-up Information     Cole Sam MD. Schedule an appointment as soon as possible for a visit in 1 week.    Specialties:  General Surgery, Oncology  Why:  For wound re-check  Contact information:  [address]  [address] [place] [id number] [phone]                 Patient Instructions:      Diet general     Call MD for:  temperature >100.4     Call MD for:  persistent nausea and vomiting     Call MD for:  severe uncontrolled pain     Call MD for:  difficulty breathing, headache or visual disturbances     Call MD for:  redness, tenderness, or signs of infection (pain, swelling, redness, odor or green/yellow discharge around incision site)     Call MD for:  hives     Remove dressing in 24 hours     Shower on day dressing removed (No bath)      Medications:  Reconciled Home Medications:      Medication List      START taking these medications    HYDROcodone-acetaminophen 5-325 mg per tablet  Commonly known as:  NORCO  Take 1 tablet by mouth every 6 (six) hours as needed for Pain.        CONTINUE taking these medications    acetaminophen 500 MG tablet  Commonly known as:  TYLENOL  Take 1,000 mg by mouth every 6 (six) hours as needed for Pain.     benzonatate 200 MG capsule  Commonly known as:  TESSALON  TAKE 1 CAPSULE BY MOUTH THREE TIMES A DAY AS NEEDED FOR COUGH FOR UP TO 7 DAYS     biotin-silicon diox-L-cysteine 5,000 mcg -10 mg-50 mg Tbsr  Take 5,000 mcg by mouth once daily.     black cohosh 540 mg Cap  Take 540 mg by mouth once daily.     EPIPEN 2-OLYA 0.3 mg/0.3 mL Atin  Generic drug:  EPINEPHrine  Inject 0.3 mLs (0.3 mg total) into the muscle once. for 1 dose     ferrous sulfate 220 mg (44 mg iron)/5 mL solution  Take 240 mg by mouth once daily.     furosemide 20 MG tablet  Commonly known as:  LASIX  Take 20 mg by mouth daily as needed.     hydroCHLOROthiazide 25 MG tablet  Commonly known as:  HYDRODIURIL  Take 25 mg by mouth once daily.     LORazepam 1 MG tablet  Commonly known as:  ATIVAN  Take 0.5 mg by mouth every 6 (six) hours as needed for Anxiety.     meloxicam 15 MG tablet  Commonly known as:  MOBIC  Take 15 mg by mouth once daily.     nitrofurantoin 100 MG capsule  Commonly known as:  MACRODANTIN  Take 100 mg by mouth daily as needed.     omeprazole 20 MG capsule  Commonly known as:  PRILOSEC  Take 40 mg by mouth every evening.     ondansetron 8 MG Tbdl  Commonly known as:  ZOFRAN-ODT  Take 8 mg by mouth every 8 (eight) hours as needed.     oxybutynin 15 MG Tr24  Commonly known as:  DITROPAN XL  Take 15 mg by mouth once daily.     simvastatin 40 MG tablet  Commonly known as:  ZOCOR     VENTOLIN HFA 90 mcg/actuation inhaler  Generic drug:  albuterol  Inhale 2 puffs into the lungs every 4 (four) hours as needed for Wheezing.            Vera  MD Stanley  General Surgery  [facility]

## 2018-12-31 NOTE — ANESTHESIA PREPROCEDURE EVALUATION
12/31/2018  Gabby Sanchez is a 48 y.o., female.    Anesthesia Evaluation    I have reviewed the Patient Summary Reports.    I have reviewed the Nursing Notes.   I have reviewed the Medications.     Review of Systems  Anesthesia Hx:  Denies Hx of Anesthetic complications N/V with previous anesthesia History of prior surgery of interest to airway management or planning: Denies Family Hx of Anesthesia complications.  Personal Hx of Anesthesia complications, Post-Operative Nausea/Vomiting, with every anesthetic, treatment not known   Social:  Non-Smoker    Cardiovascular:   Exercise tolerance: good Hypertension Denies MI.  Denies CAD.    Denies Dysrhythmias.   Denies Angina.    Pulmonary:   Asthma mild Denies Shortness of breath.  Denies Recent URI. Sleep Apnea    Renal/:  Renal/ Normal     Hepatic/GI:   nausea   Neurological:   Headaches    Endocrine:  Endocrine Normal    Psych:  Psychiatric Normal           Physical Exam  General:  Morbid Obesity    Airway/Jaw/Neck:  Airway Findings: Mouth Opening: Normal Tongue: Normal  General Airway Assessment: Adult  Mallampati: III  TM Distance: Normal, at least 6 cm     Eyes/Ears/Nose:  Eyes/Ears/Nose Findings:          Mental Status:  Mental Status Findings:  Cooperative, Alert and Oriented         Anesthesia Plan  Type of Anesthesia, risks & benefits discussed:  Anesthesia Type:  general  Patient's Preference:   Intra-op Monitoring Plan: standard ASA monitors  Intra-op Monitoring Plan Comments:   Post Op Pain Control Plan: multimodal analgesia, IV/PO Opioids PRN and per primary service following discharge from PACU  Post Op Pain Control Plan Comments:   Induction:   IV  Beta Blocker:  Patient is not currently on a Beta-Blocker (No further documentation required).       Informed Consent: Patient understands risks and agrees with Anesthesia plan.  Questions  answered. Anesthesia consent signed with patient.  ASA Score: 2     Day of Surgery Review of History & Physical: I have interviewed and examined the patient. I have reviewed the patient's H&P dated: 12/31/18. There are no significant changes.  H&P update referred to the surgeon.  H&P completed by Anesthesiologist.       Ready For Surgery From Anesthesia Perspective.

## 2018-12-31 NOTE — DISCHARGE INSTRUCTIONS
[facility]   Office # [phone]     Discharge Instructions for Same Day Surgery     Call the office for and appointment if one has not already been made.     Diet: Drink plenty of fluids the first 48 hours and you may resume your   usual diet.     Activity: No heavy lifting (over 10 pounds), pushing or pulling until your   post op visit. Your doctor's office may have told you to limit your lifting to less weight, or even no weight.  Be sure to follow those instructions.    Note: You may ride in a car and you may drive when comfortable.     Do not drive, drink alcohol, or sign legal documents for 24 hours, or if taking narcotic pain medication.    Dressings: Remove the dressing 24 hours after surgery. You may shower no tub bath  24 hours after surgery and you may wash your hair.     If you have steri strips ( appears to be strips of white tape) on   your incision, leave them on.     Medical: Call the doctor for any of the following problems: fever above 101,   severe pain, bleeding, or abdominal distention (swelling).   If constipated you may take any stool softener you choose.     Occasionally small areas of skin numbness or an unpleasant skin sensation can result. Also, you may find that your incision is swollen and tender for a few days.  Some redness around sutures and staples is a normal reaction, but if the discomfort persists or worsens, call you doctor.     MEDICATION: REMOVE SCOPOLAMINE PATCH BEHIND LEFT EAR ON Thursday AT  7:08 AM                                               Exparel information      To help control your pain after surgery, your surgeon injected Exparel (bupicacaine liposome injectable suspension) into your surgical incision just before the end of the procedure.      Exparel is a local analgesic that contains the local anesthetic bupivacaine..  Local anesthetics provide pain relief by numbing the tissue around the surgical site.    Exparel is specifically designed to  release pain medication over time an can control pain for up to 72 hours.    In addition to Exparel, your surgeon may provide other pain medications to control your pain.    Each patient is different and responds differently to pain medication.  Depending on how you respond to Exparel, you may require less additional pain medication during your recovery.    Side effects can occur with any medication and it is important not to ignore anything you may be experiencing.  Some patients who received Exparel experienced nausea, vomiting, or constipation.  Rarely, patients who receive bupivacaine (the active ingredient in Exparel) have experienced numbness and tingling in their mouth or lips, lightheadedness, or anxiety.  Speak with your doctor right away if you think you may be experiencing any of these sensations, or if you have other questions regarding possible side effects.    Products that contain bupivacaine, like Exparel, may cause a temporary loss of sensation or the ability to move in the area where bupivacaine was injected.    Other formulations of bupivacaine should not be administered within 96 hours following administrations of Exparel.  Do not remove the teal colored bracelet that you have on for 96 hours.  This bracelet will let other health care workers know that you have received Exparel, and not to give you bupivacaine during this 96 hours.  REMOVE TEAL COLORED BRACELET ON January 4, 2019 AT 8:00 AM    Fall Prevention  Millions of people fall every year and injure themselves. You may have had anesthesia or sedation which may increase your risk of falling. You may have health issues that put you at an increased risk of falling.     Here are ways to reduce your risk of falling.  ·   · Make your home safe by keeping walkways clear of objects you may trip over.  · Use non-slip pads under rugs. Do not use area rugs or small throw rugs.  · Use non-slip mats in bathtubs and showers.  · Install handrails and  lights on staircases.  · Do not walk in poorly lit areas.  · Do not stand on chairs or wobbly ladders.  · Use caution when reaching overhead or looking upward. This position can cause a loss of balance.  · Be sure your shoes fit properly, have non-slip bottoms and are in good condition.   · Wear shoes both inside and out. Avoid going barefoot or wearing slippers.  · Be cautious when going up and down stairs, curbs, and when walking on uneven sidewalks.  · If your balance is poor, consider using a cane or walker.  · If your fall was related to alcohol use, stop or limit alcohol intake.   · If your fall was related to use of sleeping medicines, talk to your doctor about this. You may need to reduce your dosage at bedtime if you awaken during the night to go to the bathroom.    · To reduce the need for nighttime bathroom trips:  ¨ Avoid drinking fluids for several hours before going to bed  ¨ Empty your bladder before going to bed  ¨ Men can keep a urinal at the bedside  · Stay as active as you can. Balance, flexibility, strength, and endurance all come from exercise. They all play a role in preventing falls. Ask your healthcare provider which types of activity are right for you.  · Get your vision checked on a regular basis.  · If you have pets, know where they are before you stand up or walk so you don't trip over them.  · Use night lights.

## 2018-12-31 NOTE — INTERVAL H&P NOTE
The patient has been examined and the H&P has been reviewed:    I concur with the findings and no changes have occurred since H&P was written.    Anesthesia/Surgery risks, benefits and alternative options discussed and understood by patient/family.          Active Hospital Problems    Diagnosis  POA    Calculus of gallbladder with chronic cholecystitis without obstruction [K80.10]  Yes      Resolved Hospital Problems   No resolved problems to display.

## 2018-12-31 NOTE — OP NOTE
Laparoscopic cholecystectomy      DATE OF PROCEDURE: 12/31/2018       PREOPERATIVE DIAGNOSIS: Symptomatic cholelithiasis.     POSTOPERATIVE DIAGNOSIS: Same     PROCEDURE PERFORMED: Laparoscopic cholecystectomy.     SURGEON: Cole Sam M.D.    ASST: Vinita Angel MD     ANESTHESIA: General.     ESTIMATED BLOOD LOSS: minimal     DESCRIPTION OF OPERATION: The patient was brought to the Operating Room, placed  on operating room table in supine position. Under adequate anesthesia, prepped  and draped around her abdomen in the usual sterile fashion. Incision was made   in umbilicus through which a 5-mm port was inserted under direct vision. The   patient had her abdomen insufflated with 3.5 liters of CO2. Two other ports   were placed. An epigastric 11 mm and off right subcostal 5 mm port were placed.  The gallbladder was identified and retracted in cephalad fashion. Dissection   was done around the triangle of Calot. The cystic duct and cystic artery were   both identified and divided. The cystic duct was clipped. The gallbladder was   removed from the gallbladder fossa in antegrade fashion and brought out through   the epigastric port site. The abdomen was desufflated. The ports were removed   and port sites closed in layers with absorbable suture. Steri-Strips were   applied as well as bandage. The patient was awakened and transported to the   Recovery Room in satisfactory condition.

## 2018-12-31 NOTE — BRIEF OP NOTE
[facility]  Brief Operative Note     SUMMARY     Surgery Date: 12/31/2018     Surgeon(s) and Role:     * Cole Sam MD - Primary    Assisting Surgeon: Vinita Angel MD    Pre-op Diagnosis:  Calculus of gallbladder with chronic cholecystitis without obstruction [K80.10]    Post-op Diagnosis:  Post-Op Diagnosis Codes:     * Calculus of gallbladder with chronic cholecystitis without obstruction [K80.10]    Procedure(s) (LRB):  CHOLECYSTECTOMY, LAPAROSCOPIC (N/A)    Anesthesia: General    Description of the findings of the procedure: gallstones    Findings/Key Components: same    Estimated Blood Loss: minimal         Specimens:   Specimen (12h ago, onward)    Start     Ordered    12/31/18 0815  Specimen to Pathology - Surgery  Once     Comments:  gallbladder     Start Status   12/31/18 0815 Collected (12/31/18 0831)       12/31/18 0815          Discharge Note    SUMMARY     Admit Date: 12/31/2018    Discharge Date and Time:  12/31/2018 8:37 AM    Hospital Course (synopsis of major diagnoses, care, treatment, and services provided during the course of the hospital stay): uneventful post op course     Final Diagnosis: Post-Op Diagnosis Codes:     * Calculus of gallbladder with chronic cholecystitis without obstruction [K80.10]    Disposition: Home or Self Care    Follow Up/Patient Instructions:     Medications:  Reconciled Home Medications:      Medication List      START taking these medications    HYDROcodone-acetaminophen 5-325 mg per tablet  Commonly known as:  NORCO  Take 1 tablet by mouth every 6 (six) hours as needed for Pain.        CONTINUE taking these medications    acetaminophen 500 MG tablet  Commonly known as:  TYLENOL  Take 1,000 mg by mouth every 6 (six) hours as needed for Pain.     benzonatate 200 MG capsule  Commonly known as:  TESSALON  TAKE 1 CAPSULE BY MOUTH THREE TIMES A DAY AS NEEDED FOR COUGH FOR UP TO 7 DAYS     biotin-silicon diox-L-cysteine 5,000 mcg -10 mg-50 mg  Tbsr  Take 5,000 mcg by mouth once daily.     black cohosh 540 mg Cap  Take 540 mg by mouth once daily.     EPIPEN 2-OLYA 0.3 mg/0.3 mL Atin  Generic drug:  EPINEPHrine  Inject 0.3 mLs (0.3 mg total) into the muscle once. for 1 dose     ferrous sulfate 220 mg (44 mg iron)/5 mL solution  Take 240 mg by mouth once daily.     furosemide 20 MG tablet  Commonly known as:  LASIX  Take 20 mg by mouth daily as needed.     hydroCHLOROthiazide 25 MG tablet  Commonly known as:  HYDRODIURIL  Take 25 mg by mouth once daily.     LORazepam 1 MG tablet  Commonly known as:  ATIVAN  Take 0.5 mg by mouth every 6 (six) hours as needed for Anxiety.     meloxicam 15 MG tablet  Commonly known as:  MOBIC  Take 15 mg by mouth once daily.     nitrofurantoin 100 MG capsule  Commonly known as:  MACRODANTIN  Take 100 mg by mouth daily as needed.     omeprazole 20 MG capsule  Commonly known as:  PRILOSEC  Take 40 mg by mouth every evening.     ondansetron 8 MG Tbdl  Commonly known as:  ZOFRAN-ODT  Take 8 mg by mouth every 8 (eight) hours as needed.     oxybutynin 15 MG Tr24  Commonly known as:  DITROPAN XL  Take 15 mg by mouth once daily.     simvastatin 40 MG tablet  Commonly known as:  ZOCOR     VENTOLIN HFA 90 mcg/actuation inhaler  Generic drug:  albuterol  Inhale 2 puffs into the lungs every 4 (four) hours as needed for Wheezing.          Discharge Procedure Orders   Diet general     Call MD for:  temperature >100.4     Call MD for:  persistent nausea and vomiting     Call MD for:  severe uncontrolled pain     Call MD for:  difficulty breathing, headache or visual disturbances     Call MD for:  redness, tenderness, or signs of infection (pain, swelling, redness, odor or green/yellow discharge around incision site)     Call MD for:  hives     Remove dressing in 24 hours     Shower on day dressing removed (No bath)

## 2018-12-31 NOTE — TRANSFER OF CARE
"Anesthesia Transfer of Care Note    Patient: Gabby Sanchez    Procedure(s) Performed: Procedure(s) (LRB):  CHOLECYSTECTOMY, LAPAROSCOPIC (N/A)    Patient location: PACU    Anesthesia Type: general    Transport from OR: Transported from OR on room air with adequate spontaneous ventilation    Post pain: adequate analgesia    Post assessment: no apparent anesthetic complications    Post vital signs: stable    Level of consciousness: awake    Nausea/Vomiting: no nausea/vomiting    Complications: none    Transfer of care protocol was followed      Last vitals:   Visit Vitals  BP (!) 150/73 (BP Location: Left arm, Patient Position: Lying)   Pulse 93   Temp 36.6 °C (97.8 °F) (Oral)   Resp 16   Ht 5' 7" (1.702 m)   Wt 121.6 kg (268 lb)   LMP 11/08/2018   SpO2 (!) 94%   Breastfeeding? No   BMI 41.97 kg/m²     "

## 2019-01-04 ENCOUNTER — TELEPHONE (OUTPATIENT)
Dept: SURGERY | Facility: CLINIC | Age: 49
End: 2019-01-04

## 2019-01-04 NOTE — TELEPHONE ENCOUNTER
Pt called c/o pain, nausea & vomiting last night and this morning after she drank cold water.  She is feeling better now.   notified.  Pt instructed to stay away from fatty foods,dairy and ice cold water.  Pt verbalized understanding.        ----- Message from Michelet Santiago sent at 1/4/2019 12:08 PM CST -----  Contact: Self/[phone]  Patient called to speak to the staff regarding cramps. She would like to know if it's normal. Thank you.

## 2019-01-05 NOTE — ANESTHESIA POSTPROCEDURE EVALUATION
"Anesthesia Post Evaluation    Patient: Gabby Sanchez    Procedure(s) Performed: Procedure(s) (LRB):  CHOLECYSTECTOMY, LAPAROSCOPIC (N/A)    Final Anesthesia Type: general  Patient location during evaluation: PACU  Patient participation: Yes- Able to Participate  Level of consciousness: awake and alert and oriented  Post-procedure vital signs: reviewed and stable  Pain management: adequate  Airway patency: patent  PONV status at discharge: No PONV  Anesthetic complications: no      Cardiovascular status: blood pressure returned to baseline and hemodynamically stable  Respiratory status: unassisted, spontaneous ventilation and room air  Hydration status: euvolemic  Follow-up not needed.        Visit Vitals  BP (!) 153/74   Pulse 89   Temp 37 °C (98.6 °F) (Oral)   Resp 16   Ht 5' 7" (1.702 m)   Wt 121.6 kg (268 lb)   LMP 11/08/2018   SpO2 97%   Breastfeeding? No   BMI 41.97 kg/m²       Pain/Joss Score: No Data Recorded      "

## 2019-01-09 ENCOUNTER — OFFICE VISIT (OUTPATIENT)
Dept: SURGERY | Facility: CLINIC | Age: 49
End: 2019-01-09
Payer: COMMERCIAL

## 2019-01-09 VITALS
DIASTOLIC BLOOD PRESSURE: 82 MMHG | BODY MASS INDEX: 42.07 KG/M2 | HEIGHT: 67 IN | WEIGHT: 268.06 LBS | HEART RATE: 80 BPM | RESPIRATION RATE: 16 BRPM | SYSTOLIC BLOOD PRESSURE: 131 MMHG

## 2019-01-09 DIAGNOSIS — K80.10 CALCULUS OF GALLBLADDER WITH CHRONIC CHOLECYSTITIS WITHOUT OBSTRUCTION: Primary | ICD-10-CM

## 2019-01-09 PROCEDURE — 99024 POSTOP FOLLOW-UP VISIT: CPT | Mod: S$GLB,,, | Performed by: SURGERY

## 2019-01-09 PROCEDURE — 99024 PR POST-OP FOLLOW-UP VISIT: ICD-10-PCS | Mod: S$GLB,,, | Performed by: SURGERY

## 2019-01-09 RX ORDER — HYDROCODONE BITARTRATE AND ACETAMINOPHEN 5; 325 MG/1; MG/1
1 TABLET ORAL EVERY 6 HOURS PRN
Qty: 30 TABLET | Refills: 0 | Status: SHIPPED | OUTPATIENT
Start: 2019-01-09

## 2019-01-09 RX ORDER — FLUOXETINE HYDROCHLORIDE 20 MG/1
20 CAPSULE ORAL DAILY
Refills: 0 | COMMUNITY
Start: 2018-12-27

## 2019-01-09 NOTE — PROGRESS NOTES
Subjective:       Patient ID: Gabby Sanchez is a 48 y.o. female.    Chief Complaint: Post-op Evaluation (Patient states doing well from surgery just a little sore)    HPI 47 yo female s/p lap patty without complaints except for some residual soreness  Review of Systems   Constitutional: Negative.    HENT: Negative.    Eyes: Negative.    Respiratory: Negative.    Cardiovascular: Negative.    Gastrointestinal: Negative.    Endocrine: Negative.    Musculoskeletal: Negative.    Skin: Negative.    Allergic/Immunologic: Negative.    Neurological: Negative.    Hematological: Negative.    Psychiatric/Behavioral: Negative.    All other systems reviewed and are negative.      Objective:      Physical Exam   Constitutional: She is oriented to person, place, and time. She appears well-developed and well-nourished.   HENT:   Head: Normocephalic and atraumatic.   Right Ear: External ear normal.   Left Ear: External ear normal.   Nose: Nose normal.   Mouth/Throat: Oropharynx is clear and moist.   Eyes: Conjunctivae and EOM are normal. Pupils are equal, round, and reactive to light.   Neck: Normal range of motion. Neck supple.   Cardiovascular: Normal rate, regular rhythm, normal heart sounds and intact distal pulses.   Pulmonary/Chest: Effort normal and breath sounds normal.   Abdominal: Soft. Bowel sounds are normal.   Musculoskeletal: Normal range of motion.   Neurological: She is alert and oriented to person, place, and time. She has normal reflexes.   Skin: Skin is warm and dry.   Psychiatric: She has a normal mood and affect. Her behavior is normal. Thought content normal.   Vitals reviewed.      Assessment:       1. Calculus of gallbladder with chronic cholecystitis without obstruction        Plan:       I will see her prn and give her another pain script

## 2025-04-28 ENCOUNTER — OFFICE VISIT (OUTPATIENT)
Dept: CARDIOLOGY | Facility: CLINIC | Age: 55
End: 2025-04-28
Payer: COMMERCIAL

## 2025-04-28 VITALS
HEIGHT: 67 IN | DIASTOLIC BLOOD PRESSURE: 73 MMHG | SYSTOLIC BLOOD PRESSURE: 109 MMHG | HEART RATE: 91 BPM | WEIGHT: 240.5 LBS | BODY MASS INDEX: 37.75 KG/M2

## 2025-04-28 DIAGNOSIS — R60.9 EDEMA, UNSPECIFIED TYPE: ICD-10-CM

## 2025-04-28 DIAGNOSIS — I10 PRIMARY HYPERTENSION: ICD-10-CM

## 2025-04-28 DIAGNOSIS — E78.2 MIXED HYPERLIPIDEMIA: ICD-10-CM

## 2025-04-28 DIAGNOSIS — R07.89 CHEST PRESSURE: ICD-10-CM

## 2025-04-28 DIAGNOSIS — R00.2 PALPITATION: Primary | ICD-10-CM

## 2025-04-28 DIAGNOSIS — E66.09 OBESITY DUE TO EXCESS CALORIES WITH SERIOUS COMORBIDITY IN PEDIATRIC PATIENT, UNSPECIFIED BMI: ICD-10-CM

## 2025-04-28 PROCEDURE — 93000 ELECTROCARDIOGRAM COMPLETE: CPT | Mod: S$GLB,,, | Performed by: INTERNAL MEDICINE

## 2025-04-28 PROCEDURE — 99999 PR PBB SHADOW E&M-NEW PATIENT-LVL III: CPT | Mod: PBBFAC,,, | Performed by: INTERNAL MEDICINE

## 2025-04-28 PROCEDURE — 99204 OFFICE O/P NEW MOD 45 MIN: CPT | Mod: 25,S$GLB,, | Performed by: INTERNAL MEDICINE

## 2025-04-28 RX ORDER — LANOLIN ALCOHOL/MO/W.PET/CERES
2500 CREAM (GRAM) TOPICAL DAILY
COMMUNITY

## 2025-04-28 RX ORDER — FLUTICASONE PROPIONATE 50 MCG
1 SPRAY, SUSPENSION (ML) NASAL DAILY PRN
COMMUNITY
Start: 2024-12-12

## 2025-04-28 RX ORDER — MULTIVITAMIN
1 TABLET ORAL DAILY
COMMUNITY

## 2025-04-28 RX ORDER — AMLODIPINE BESYLATE 10 MG/1
10 TABLET ORAL DAILY
COMMUNITY

## 2025-04-28 RX ORDER — CALCIUM CARBONATE 300MG(750)
1 TABLET,CHEWABLE ORAL DAILY
COMMUNITY

## 2025-04-28 RX ORDER — SEMAGLUTIDE 0.5 MG/.5ML
0.5 INJECTION, SOLUTION SUBCUTANEOUS
COMMUNITY
Start: 2025-01-08

## 2025-04-28 RX ORDER — METOPROLOL SUCCINATE 25 MG/1
25 TABLET, EXTENDED RELEASE ORAL DAILY
COMMUNITY
Start: 2024-12-12

## 2025-04-28 RX ORDER — FLUTICASONE PROPIONATE AND SALMETEROL 250; 50 UG/1; UG/1
1 POWDER RESPIRATORY (INHALATION) DAILY PRN
COMMUNITY
Start: 2024-12-13 | End: 2025-12-13

## 2025-04-28 RX ORDER — POTASSIUM CHLORIDE 20 MEQ/1
40 TABLET, EXTENDED RELEASE ORAL 2 TIMES DAILY
COMMUNITY
Start: 2024-12-12

## 2025-04-28 RX ORDER — LOSARTAN POTASSIUM 100 MG/1
100 TABLET ORAL DAILY
COMMUNITY
Start: 2024-12-09 | End: 2025-04-28

## 2025-04-28 RX ORDER — CETIRIZINE HYDROCHLORIDE 10 MG/1
10 TABLET ORAL DAILY
COMMUNITY
Start: 2024-12-12 | End: 2025-12-12

## 2025-04-28 RX ORDER — ESTRADIOL 0.5 MG/1
0.5 TABLET ORAL DAILY
COMMUNITY
Start: 2024-12-26

## 2025-04-28 RX ORDER — FUROSEMIDE 40 MG/1
40 TABLET ORAL 2 TIMES DAILY
COMMUNITY
Start: 2024-12-12

## 2025-04-28 NOTE — PROGRESS NOTES
Patient ID: Gabby Sanchez is a 55 y.o. female.    History of Present Illness    CHIEF COMPLAINT:  - History of HTN, HLD, and obesity presents for evaluation of chest fluttering, chest pain, and fatigue.    HPI:    Patient reports palpitations described as fluttering in her chest, which began in early April and persisted for 2 weeks continuously. She has previously experienced this sensation 2 years ago, but for a shorter duration. The most recent episode occurred 1 week ago. During these episodes, she feels extremely fatigued, but the fatigue resolves when the palpitations subside.    She reports chest pain, ongoing for a few years but increased in frequency over the past month. She now experiences it daily or every other day, with each episode lasting 1 minute. She describes pain as sharp and intense, persisting briefly before resolving.    She also reports significant fatigue, even on days with minimal activity. She sleeps for long hours but still feels tired upon waking.    She has lost 65 lbs recently, with 35 lbs lost since December after starting semaglutide. She has been more active in the past 2 months, including doing work at her [employer] and going to the pool. She also started physical therapy for her knees.    She reports edema in her face, feet, and knees, particularly when traveling on small flights. She mentions falling 3 times when her feet were swollen, which occurred in October.    She had a significant case of COVID-19 in January 2020.    She denies lightheadedness, dizziness, history of heart attack or stroke, and limitations in her activities due to the palpitations.    CARDIAC HISTORY (IMAGING REVIEWED DURING VISIT):  - Echo 2021 at [facility]: Normal RV size and function, moderate LVH, LVEF 65-70%, no significant valve disease  - CT chest 2021 at [facility]: Atherosclerosis, no evidence of aneurysm, mild cardiomegaly  - EKG April 2025:  Sinus rhythm with poor R-wave progression.  No ST  changes.    MEDICAL HISTORY:  - HTN  - HLD  - Obesity  - COVID-19: 2020    SURGICAL HISTORY:  - Knee surgeries: 5 previous surgeries, details not specified    MEDICATIONS:  - Amlodipine 10 mg daily for HTN  - Furosemide 40 mg twice daily  - HCTZ 25 mg twice daily  - Metoprolol succinate 25 mg daily  - Simvastatin 40 mg daily for HLD  - Potassium twice daily (with furosemide)  - Discontinued losartan due to side effect of coughing  - Semaglutide injection for weight loss (started in December)    FAMILY HISTORY:  - Mother:  age 85 due to massive MI in 2024    SOCIAL HISTORY:  - Smoking: Never smoked         Physical Exam    Vitals: Pulse: 91. Blood pressure: 109/73.  Cardiovascular: Regular rate and rhythm with a normal S1 and S2, no murmurs. No JVD or carotid bruit.  Extremities: No lower extremity edema.  Lungs: Lung fields clear to auscultation with no crackles.         LABS:  Hemoglobin:      MCV:      Platelet:      A1C:  Recent Labs   Lab 22  0840 24  1234   % HEMOGLOBIN A1C 6.1 H 5.3     Creatinine:  Recent Labs   Lab 22  0840 23  1404 24  1234   Creatinine 0.52 0.51 0.55     BUN:  Recent Labs   Lab 22  0840 23  1404 24  1234   Blood Urea Nitrogen 14 14 13         Assessment & Plan    R00.2 Palpitation  R07.89 Chest pressure  I10 Primary hypertension  E78.2 Mixed hyperlipidemia  E66.09 Obesity due to excess calories with serious comorbidity in pediatric patient, unspecified BMI  R60.9 Edema, unspecified type    PALPITATION:  - Ordered 24-hour Holter monitor to assess palpitations.    CHEST PRESSURE:  - Ordered nuclear stress test with regadenoson to evaluate coronary arteries.    PRIMARY HYPERTENSION:  - Discontinued HCTZ 25 mg twice daily to reduce risk of dehydration.  - Continued amlodipine 10 mg daily and metoprolol 25 mg daily for blood pressure management.  - Patient to start checking blood pressure at home regularly.  - previous history  of LVH and poorly-controlled hypertension noted.    MIXED HYPERLIPIDEMIA:  - Continued simvastatin 40 mg daily.  - Labs to be checked in 1 week (including lipid panel) before the next visit.    EDEMA, UNSPECIFIED TYPE:  - Continued furosemide 40 mg twice daily.  - Continued potassium supplement twice daily (to be taken with furosemide).  - Considered venous US to evaluate potential venous insufficiency.         This note was generated with the assistance of ambient listening technology. Verbal consent was obtained by the patient and accompanying visitor(s) for the recording of patient appointment to facilitate this note. I attest to having reviewed and edited the generated note for accuracy, though some syntax or spelling errors may persist. Please contact the author of this note for any clarification.

## 2025-04-29 ENCOUNTER — HOSPITAL ENCOUNTER (OUTPATIENT)
Dept: CARDIOLOGY | Facility: HOSPITAL | Age: 55
Discharge: HOME | End: 2025-04-29
Attending: INTERNAL MEDICINE
Payer: COMMERCIAL

## 2025-04-29 DIAGNOSIS — R60.9 EDEMA, UNSPECIFIED TYPE: ICD-10-CM

## 2025-04-29 LAB
LEFT GREAT SAPHENOUS DISTAL THIGH DIA: 0.32 CM
LEFT GREAT SAPHENOUS JUNCTION DIA: 0.81 CM
LEFT GREAT SAPHENOUS KNEE DIA: 0.25 CM
LEFT GREAT SAPHENOUS MIDDLE THIGH DIA: 0.47 CM
LEFT GREAT SAPHENOUS PROXIMAL CALF DIA: 0.17 CM
LEFT SMALL SAPHENOUS SPJ DIA: 0.31 CM
OHS QRS DURATION: 82 MS
OHS QTC CALCULATION: 471 MS
RIGHT GREAT SAPHENOUS DISTAL THIGH DIA: 0.33 CM
RIGHT GREAT SAPHENOUS JUNCTION DIA: 0.83 CM
RIGHT GREAT SAPHENOUS KNEE DIA: 0.41 CM
RIGHT GREAT SAPHENOUS MIDDLE THIGH DIA: 0.49 CM
RIGHT GREAT SAPHENOUS PROXIMAL CALF DIA: 0.31 CM
RIGHT SMALL SAPHENOUS SPJ DIA: 0.41 CM

## 2025-04-29 PROCEDURE — 93970 EXTREMITY STUDY: CPT | Mod: TC

## 2025-04-29 PROCEDURE — 93970 EXTREMITY STUDY: CPT | Mod: 26,,, | Performed by: INTERNAL MEDICINE

## 2025-05-02 ENCOUNTER — HOSPITAL ENCOUNTER (OUTPATIENT)
Dept: CARDIOLOGY | Facility: HOSPITAL | Age: 55
Discharge: HOME | End: 2025-05-02
Attending: INTERNAL MEDICINE
Payer: COMMERCIAL

## 2025-05-02 VITALS
WEIGHT: 240 LBS | DIASTOLIC BLOOD PRESSURE: 83 MMHG | SYSTOLIC BLOOD PRESSURE: 143 MMHG | HEIGHT: 67 IN | HEART RATE: 69 BPM | BODY MASS INDEX: 37.67 KG/M2

## 2025-05-02 DIAGNOSIS — R00.2 PALPITATION: ICD-10-CM

## 2025-05-02 DIAGNOSIS — R07.89 CHEST PRESSURE: ICD-10-CM

## 2025-05-02 LAB
CV PHARM DOSE: 0.4 MG
CV STRESS BASE HR: 69 BPM
DIASTOLIC BLOOD PRESSURE: 83 MMHG
OHS CV CPX 1 MINUTE RECOVERY HEART RATE: 103 BPM
OHS CV CPX 85 PERCENT MAX PREDICTED HEART RATE MALE: 139
OHS CV CPX MAX PREDICTED HEART RATE: 163
OHS CV CPX PATIENT IS FEMALE: 1
OHS CV CPX PATIENT IS MALE: 0
OHS CV CPX PEAK DIASTOLIC BLOOD PRESSURE: 83 MMHG
OHS CV CPX PEAK HEAR RATE: 82 BPM
OHS CV CPX PEAK RATE PRESSURE PRODUCT: NORMAL
OHS CV CPX PEAK SYSTOLIC BLOOD PRESSURE: 146 MMHG
OHS CV CPX PERCENT MAX PREDICTED HEART RATE ACHIEVED: 53
OHS CV CPX RATE PRESSURE PRODUCT PRESENTING: 9867
OHS CV INITIAL DOSE: 10.3 MCG/KG/MIN
OHS CV PEAK DOSE: 32.5 MCG/KG/MIN
SYSTOLIC BLOOD PRESSURE: 143 MMHG

## 2025-05-02 PROCEDURE — 93226 XTRNL ECG REC<48 HR SCAN A/R: CPT | Mod: PO

## 2025-05-02 PROCEDURE — 93227 XTRNL ECG REC<48 HR R&I: CPT | Mod: ,,, | Performed by: INTERNAL MEDICINE

## 2025-05-02 PROCEDURE — A9502 TC99M TETROFOSMIN: HCPCS | Performed by: INTERNAL MEDICINE

## 2025-05-02 PROCEDURE — 63600175 PHARM REV CODE 636 W HCPCS: Performed by: INTERNAL MEDICINE

## 2025-05-02 PROCEDURE — 93017 CV STRESS TEST TRACING ONLY: CPT

## 2025-05-02 RX ORDER — AMINOPHYLLINE 25 MG/ML
75 INJECTION, SOLUTION INTRAVENOUS ONCE
Status: COMPLETED | OUTPATIENT
Start: 2025-05-02 | End: 2025-05-02

## 2025-05-02 RX ORDER — REGADENOSON 0.08 MG/ML
0.4 INJECTION, SOLUTION INTRAVENOUS
Status: COMPLETED | OUTPATIENT
Start: 2025-05-02 | End: 2025-05-02

## 2025-05-02 RX ADMIN — REGADENOSON 0.4 MG: 0.08 INJECTION, SOLUTION INTRAVENOUS at 08:05

## 2025-05-02 RX ADMIN — AMINOPHYLLINE 75 MG: 25 INJECTION, SOLUTION INTRAVENOUS at 08:05

## 2025-05-02 RX ADMIN — TETROFOSMIN 10.3 MILLICURIE: 1.38 INJECTION, POWDER, LYOPHILIZED, FOR SOLUTION INTRAVENOUS at 07:05

## 2025-05-02 RX ADMIN — TETROFOSMIN 32.5 MILLICURIE: 1.38 INJECTION, POWDER, LYOPHILIZED, FOR SOLUTION INTRAVENOUS at 08:05

## 2025-05-05 LAB
CV PHARM DOSE: 0.4 MG
CV STRESS BASE HR: 69 BPM
DIASTOLIC BLOOD PRESSURE: 83 MMHG
EJECTION FRACTION- HIGH: 65 %
END DIASTOLIC INDEX-HIGH: 153 ML/M2
END DIASTOLIC INDEX-LOW: 93 ML/M2
END SYSTOLIC INDEX-HIGH: 71 ML/M2
END SYSTOLIC INDEX-LOW: 31 ML/M2
NUC REST DIASTOLIC VOLUME INDEX: 127
NUC REST EJECTION FRACTION: 54
NUC REST SYSTOLIC VOLUME INDEX: 59
NUC STRESS DIASTOLIC VOLUME INDEX: 102
NUC STRESS EJECTION FRACTION: 73 %
NUC STRESS SYSTOLIC VOLUME INDEX: 28
OHS CV CPX 1 MINUTE RECOVERY HEART RATE: 103 BPM
OHS CV CPX 85 PERCENT MAX PREDICTED HEART RATE MALE: 139
OHS CV CPX MAX PREDICTED HEART RATE: 163
OHS CV CPX PATIENT IS FEMALE: 1
OHS CV CPX PATIENT IS MALE: 0
OHS CV CPX PEAK DIASTOLIC BLOOD PRESSURE: 83 MMHG
OHS CV CPX PEAK HEAR RATE: 82 BPM
OHS CV CPX PEAK RATE PRESSURE PRODUCT: NORMAL
OHS CV CPX PEAK SYSTOLIC BLOOD PRESSURE: 146 MMHG
OHS CV CPX PERCENT MAX PREDICTED HEART RATE ACHIEVED: 53
OHS CV CPX RATE PRESSURE PRODUCT PRESENTING: 9867
OHS CV INITIAL DOSE: 10.3 MCG/KG/MIN
OHS CV PEAK DOSE: 32.5 MCG/KG/MIN
RETIRED EF AND QEF - SEE NOTES: 53 %
SUMMED DIFFERENCE: 0
SUMMED REST SCORE: 0
SUMMED STRESS SCORE: 0
SYSTOLIC BLOOD PRESSURE: 143 MMHG

## 2025-05-06 LAB
OHS CV EVENT MONITOR DAY: 0
OHS CV HOLTER LENGTH DECIMAL HOURS: 23.98
OHS CV HOLTER LENGTH HOURS: 23
OHS CV HOLTER LENGTH MINUTES: 59
OHS CV HOLTER SINUS AVERAGE HR: 78
OHS CV HOLTER SINUS MAX HR: 118
OHS CV HOLTER SINUS MIN HR: 58

## 2025-05-08 ENCOUNTER — OFFICE VISIT (OUTPATIENT)
Dept: CARDIOLOGY | Facility: CLINIC | Age: 55
End: 2025-05-08
Payer: COMMERCIAL

## 2025-05-08 DIAGNOSIS — E66.812 CLASS 2 OBESITY DUE TO EXCESS CALORIES WITHOUT SERIOUS COMORBIDITY WITH BODY MASS INDEX (BMI) OF 37.0 TO 37.9 IN ADULT: ICD-10-CM

## 2025-05-08 DIAGNOSIS — R07.89 CHEST PRESSURE: ICD-10-CM

## 2025-05-08 DIAGNOSIS — R00.2 PALPITATIONS: Primary | ICD-10-CM

## 2025-05-08 DIAGNOSIS — E66.09 CLASS 2 OBESITY DUE TO EXCESS CALORIES WITHOUT SERIOUS COMORBIDITY WITH BODY MASS INDEX (BMI) OF 37.0 TO 37.9 IN ADULT: ICD-10-CM

## 2025-05-08 DIAGNOSIS — I10 PRIMARY HYPERTENSION: ICD-10-CM

## 2025-05-08 DIAGNOSIS — E78.2 MIXED HYPERLIPIDEMIA: ICD-10-CM

## 2025-05-08 DIAGNOSIS — R60.9 EDEMA, UNSPECIFIED TYPE: ICD-10-CM

## 2025-05-08 PROCEDURE — 99999 PR PBB SHADOW E&M-EST. PATIENT-LVL IV: CPT | Mod: PBBFAC,,, | Performed by: INTERNAL MEDICINE

## 2025-05-08 PROCEDURE — 99214 OFFICE O/P EST MOD 30 MIN: CPT | Mod: S$GLB,,, | Performed by: INTERNAL MEDICINE

## 2025-05-08 RX ORDER — FLUTICASONE PROPIONATE AND SALMETEROL 250; 50 UG/1; UG/1
1 POWDER RESPIRATORY (INHALATION) DAILY PRN
Qty: 2 EACH | Refills: 11 | Status: SHIPPED | OUTPATIENT
Start: 2025-05-08 | End: 2026-05-08

## 2025-05-08 RX ORDER — SIMVASTATIN 80 MG/1
80 TABLET, FILM COATED ORAL NIGHTLY
Qty: 90 TABLET | Refills: 3 | Status: SHIPPED | OUTPATIENT
Start: 2025-05-08
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
## (undated) DEVICE — ELECTRODE REM PLYHSV RETURN 9

## (undated) DEVICE — SUT 4/0 18IN COATED VICRYL

## (undated) DEVICE — UNDERGLOVES BIOGEL PI SZ 7 LF

## (undated) DEVICE — TUBING INSUFFLATION 10

## (undated) DEVICE — SYR 10CC LUER LOCK

## (undated) DEVICE — CLOSURE SKIN STERI STRIP 1/2X4

## (undated) DEVICE — PACK ENDOSCOPY GENERAL

## (undated) DEVICE — BLANKET UPPER BODY 78.7X29.9IN

## (undated) DEVICE — NDL HYPO REG 25G X 1 1/2

## (undated) DEVICE — DRESSING ADH ISLAND 2.5 X 3

## (undated) DEVICE — KIT ANTIFOG

## (undated) DEVICE — SUPPORT ULNA NERVE PROTECTOR

## (undated) DEVICE — TROCAR ENDOPATH XCEL 11MM 10CM

## (undated) DEVICE — APPLIER CLIP ENDO LIGAMAX 5MM

## (undated) DEVICE — Device

## (undated) DEVICE — SEE MEDLINE ITEM 152622

## (undated) DEVICE — COVER OVERHEAD SURG LT BLUE

## (undated) DEVICE — CANISTER SUCTION 2 LTR

## (undated) DEVICE — GLOVE BIOGEL 7.5

## (undated) DEVICE — APPLICATOR CHLORAPREP ORN 26ML

## (undated) DEVICE — FOAM APP FILM BARRIER NO STING

## (undated) DEVICE — SUT MCRYL PLUS 4-0 PS2 27IN

## (undated) DEVICE — SEE MEDLINE ITEM 146417

## (undated) DEVICE — BLADE SURG CARBON STEEL SZ11

## (undated) DEVICE — SOL NS 1000CC

## (undated) DEVICE — SEE L#120831

## (undated) DEVICE — IRRIGATOR ENDOSCOPY DISP.

## (undated) DEVICE — TROCAR ENDOPATH XCEL 5X100MM